# Patient Record
Sex: FEMALE | Race: WHITE | Employment: STUDENT | URBAN - NONMETROPOLITAN AREA
[De-identification: names, ages, dates, MRNs, and addresses within clinical notes are randomized per-mention and may not be internally consistent; named-entity substitution may affect disease eponyms.]

---

## 2017-01-24 ENCOUNTER — HOSPITAL ENCOUNTER (OUTPATIENT)
Dept: PHYSICAL THERAPY | Age: 22
Discharge: HOME | End: 2017-01-24
Admitting: ORTHOPAEDIC SURGERY

## 2017-01-25 ENCOUNTER — OFFICE VISIT (OUTPATIENT)
Dept: ORTHOPEDIC SURGERY | Age: 22
End: 2017-01-25

## 2017-01-25 VITALS — HEIGHT: 67 IN | BODY MASS INDEX: 29.82 KG/M2 | WEIGHT: 190 LBS

## 2017-01-25 DIAGNOSIS — M54.6 ACUTE MIDLINE THORACIC BACK PAIN: Primary | ICD-10-CM

## 2017-01-25 PROCEDURE — 99213 OFFICE O/P EST LOW 20 MIN: CPT | Performed by: ORTHOPAEDIC SURGERY

## 2017-01-31 ENCOUNTER — HOSPITAL ENCOUNTER (OUTPATIENT)
Dept: PHYSICAL THERAPY | Age: 22
Discharge: HOME | End: 2017-01-31
Admitting: ORTHOPAEDIC SURGERY

## 2017-02-16 ENCOUNTER — HOSPITAL ENCOUNTER (OUTPATIENT)
Dept: PHYSICAL THERAPY | Age: 22
Discharge: HOME | End: 2017-02-16
Admitting: ORTHOPAEDIC SURGERY

## 2017-02-23 ENCOUNTER — HOSPITAL ENCOUNTER (OUTPATIENT)
Dept: PHYSICAL THERAPY | Age: 22
Discharge: HOME | End: 2017-02-23
Admitting: ORTHOPAEDIC SURGERY

## 2017-03-01 ENCOUNTER — HOSPITAL ENCOUNTER (OUTPATIENT)
Dept: PHYSICAL THERAPY | Age: 22
Discharge: HOME | End: 2017-03-01
Admitting: ORTHOPAEDIC SURGERY

## 2017-03-07 ENCOUNTER — HOSPITAL ENCOUNTER (OUTPATIENT)
Dept: PHYSICAL THERAPY | Age: 22
Discharge: HOME | End: 2017-03-07
Admitting: ORTHOPAEDIC SURGERY

## 2017-03-09 ENCOUNTER — HOSPITAL ENCOUNTER (OUTPATIENT)
Dept: PHYSICAL THERAPY | Age: 22
Discharge: HOME | End: 2017-03-09
Admitting: ORTHOPAEDIC SURGERY

## 2017-03-14 ENCOUNTER — HOSPITAL ENCOUNTER (OUTPATIENT)
Dept: PHYSICAL THERAPY | Age: 22
Discharge: HOME | End: 2017-03-14
Admitting: ORTHOPAEDIC SURGERY

## 2017-03-16 ENCOUNTER — HOSPITAL ENCOUNTER (OUTPATIENT)
Dept: PHYSICAL THERAPY | Age: 22
Discharge: HOME | End: 2017-03-16
Admitting: ORTHOPAEDIC SURGERY

## 2017-03-21 ENCOUNTER — HOSPITAL ENCOUNTER (OUTPATIENT)
Dept: PHYSICAL THERAPY | Age: 22
Discharge: HOME | End: 2017-03-21
Admitting: ORTHOPAEDIC SURGERY

## 2017-03-23 ENCOUNTER — HOSPITAL ENCOUNTER (OUTPATIENT)
Dept: PHYSICAL THERAPY | Age: 22
Discharge: HOME | End: 2017-03-23
Admitting: ORTHOPAEDIC SURGERY

## 2017-03-28 ENCOUNTER — HOSPITAL ENCOUNTER (OUTPATIENT)
Dept: PHYSICAL THERAPY | Age: 22
Discharge: HOME | End: 2017-03-28
Admitting: ORTHOPAEDIC SURGERY

## 2017-03-30 ENCOUNTER — HOSPITAL ENCOUNTER (OUTPATIENT)
Dept: PHYSICAL THERAPY | Age: 22
Discharge: HOME | End: 2017-03-30
Admitting: ORTHOPAEDIC SURGERY

## 2017-04-04 ENCOUNTER — HOSPITAL ENCOUNTER (OUTPATIENT)
Dept: PHYSICAL THERAPY | Age: 22
Discharge: HOME | End: 2017-04-04
Admitting: ORTHOPAEDIC SURGERY

## 2017-04-11 ENCOUNTER — HOSPITAL ENCOUNTER (OUTPATIENT)
Dept: PHYSICAL THERAPY | Age: 22
Discharge: HOME | End: 2017-04-11
Admitting: ORTHOPAEDIC SURGERY

## 2017-04-27 ENCOUNTER — HOSPITAL ENCOUNTER (OUTPATIENT)
Dept: PHYSICAL THERAPY | Age: 22
Discharge: HOME | End: 2017-04-27
Admitting: ORTHOPAEDIC SURGERY

## 2017-05-02 ENCOUNTER — HOSPITAL ENCOUNTER (OUTPATIENT)
Dept: PHYSICAL THERAPY | Age: 22
Discharge: HOME | End: 2017-05-02
Admitting: ORTHOPAEDIC SURGERY

## 2017-05-04 ENCOUNTER — HOSPITAL ENCOUNTER (OUTPATIENT)
Dept: PHYSICAL THERAPY | Age: 22
Discharge: HOME | End: 2017-05-04
Admitting: ORTHOPAEDIC SURGERY

## 2017-05-09 ENCOUNTER — HOSPITAL ENCOUNTER (OUTPATIENT)
Dept: PHYSICAL THERAPY | Age: 22
Discharge: HOME | End: 2017-05-09
Admitting: ORTHOPAEDIC SURGERY

## 2018-01-31 ENCOUNTER — OFFICE VISIT (OUTPATIENT)
Dept: ORTHOPEDIC SURGERY | Age: 23
End: 2018-01-31

## 2018-01-31 VITALS — HEIGHT: 67 IN | BODY MASS INDEX: 29.82 KG/M2 | WEIGHT: 190 LBS

## 2018-01-31 DIAGNOSIS — S29.019D THORACIC MYOFASCIAL STRAIN, SUBSEQUENT ENCOUNTER: Primary | ICD-10-CM

## 2018-01-31 PROCEDURE — 99214 OFFICE O/P EST MOD 30 MIN: CPT | Performed by: ORTHOPAEDIC SURGERY

## 2018-01-31 NOTE — PROGRESS NOTES
History of Present Illness: Recheck evaluation still having left mid scapular pain  Brian Marrufo is a 25 y.o. female    Location left Shoulder  Severity moderate  Duration it's really not gone completely away since I last saw her  Associated sign/symptoms pain with sleeping pain with training pain with almost all activities    Medical History  Patient's medications, allergies, past medical, surgical, social and family histories were reviewed and updated as appropriate. Review of Systems  Pertinent items are noted in HPI  Review of systems reviewed from Patient History Form dated on 1/31/18 and available in the patient's chart under the Media tab. No change in her medical history form                                         Examination    General Exam:   Vitals: Height 5' 7\" (1.702 m), weight 190 lb (86.2 kg). Constitutional: Patient is adequately groomed with no evidence of malnutrition  Mental Status: The patient is oriented to time, place and person. The patient's mood and affect are appropriate. PHYSICAL EXAM:      Shoulder Examination  Inspection:  No swelling, no deformity, no erythema, no drainage, no signs of infection     Palpation:  Palpation reveals no effusion minimal pain, no warmth,     Range of Motion:  genital range of motion with no crepitus passive motion to 150° of forward flextion    Strength:  Intact strength with internal and external rotation along with  supraspinatus isolation bilaterally, Shoulder shrug is 5 over 5 , cervical spine strength is excellent, flexion extension at the elbow is 5 over 5 wrist and hand strength is equal bilaterally no winging no muscle atrophy.    Palpation:  Lateral deltoid no pain to palpation  AC joint no pain to palopation  No pain Anterior to palpation  Moderate pain Posterior to palpation  Mild trapezial pain to palpation  Range of Motion:  Abduction --150 degrees  Flexion-- 180 degrees  Extension-- between 45-60 degrees  Latera/external  rotation --close to 90 degrees  Medial/ internal rotation -- between 70-90 degrees    Strength:  Left shoulder strength:   internal rotation against resistance is 5/5  external rotation against resistance is 5/5  and supraspinatus isolation against resistance is 5/5, Shoulder shrug is 5 over 5 , cervical spine strength is excellent, flexion extension at the elbow is 5 over 5 wrist and hand strength is equal bilaterally with supination pronation and flexion and extension  no winging no muscle atrophy. Special Tests: Palpation demonstrates no swelling no effusion no pain. There is full active and passive range of motion bilaterally. Strength is excellent with internal rotation against resistance external rotation against resistance supraspinatus isolation against resistance. Shoulder shrug strength is 5 over 5 equal bilaterally. Radial ulnar and median nerve function is intact. Capillary refill is brisk. Elbow motion finger and wrist motion is full equal bilaterally. Deep tendon reflexes of the Brachial radialis, biceps, tricepsAre all +2/4 equal bilaterally. Cervical spine range of motion is full without pain negative Spurling's test.  Load-and-shift test is negative. Crank test is negative. Apprehension and relocation is negative. Anterior and posterior glide are equal bilaterally. Negative sulcus sign. No signs of any significant multidirectional instability. There is no scapular winging. There is no muscle atrophy of the latissimus dorsi, the deltoid, the periscapular musculature,The trapezius musculature or the pectoralis musculature. Negative Neer's test, negative Fong test, no pain with crossarm elevation. Contralateral Shoulder exam: Palpation demonstrates no swelling no effusion no pain. There is full active and passive range of motion bilaterally.   Strength is excellent with internal rotation against resistance external rotation against resistance

## 2018-02-01 ENCOUNTER — HOSPITAL ENCOUNTER (OUTPATIENT)
Dept: OTHER | Age: 23
Discharge: HOME | End: 2018-02-28
Attending: ORTHOPAEDIC SURGERY | Admitting: ORTHOPAEDIC SURGERY

## 2018-02-05 ENCOUNTER — HOSPITAL ENCOUNTER (OUTPATIENT)
Dept: PHYSICAL THERAPY | Age: 23
Discharge: HOME | End: 2018-01-31
Admitting: ORTHOPAEDIC SURGERY

## 2018-02-07 ENCOUNTER — HOSPITAL ENCOUNTER (OUTPATIENT)
Dept: PHYSICAL THERAPY | Age: 23
Discharge: HOME | End: 2018-02-08
Admitting: ORTHOPAEDIC SURGERY

## 2018-02-07 NOTE — FLOWSHEET NOTE
[address] [place] [address]  Phone: [phone] Fax: [phone]      Physical Therapy Daily Treatment Note  Date:  2018    Patient Name:  Rojelio Schofield    :  1995  MRN: [id number]  Restrictions/Precautions:    Medical/Treatment Diagnosis Information:  Diagnosis: thoracic pain  Treatment Diagnosis: thoracic pain M54.6; shoulder and periscapular weakness N55.7  Insurance/Certification information:  PT Insurance Information: [employer]  Physician Information:  Referring Practitioner: Dr. Zheng Reed of care signed (Y/N):     Date of Patient follow up with Physician:     G-Code (if applicable):      Date G-Code Applied:  2018  PT G-Codes  Functional Assessment Tool Used: BRADY  Score: 22%  Functional Limitation: Changing and maintaining body position  Changing and Maintaining Body Position Current Status (): At least 20 percent but less than 40 percent impaired, limited or restricted  Changing and Maintaining Body Position Goal Status (): At least 1 percent but less than 20 percent impaired, limited or restricted    Progress Note: [x]  Yes  [x]  No  Next due by: Visit #10      Latex Allergy:  [x]NO      []YES  Preferred Language for Healthcare:   [x]English       []other:    Visit # Insurance Allowable   [facility]     Pain level:  4/10     SUBJECTIVE:  Patient reports that she was really sore after last session in upper back. States that lower L side of thoracic spine is really sore today and bothering her when she breathes. 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 States exercises at home are doing good; tiring but good. OBJECTIVE: mild deficits in L shoulder AROM compared to R shoulder. Observation:   Test measurements:      RESTRICTIONS/PRECAUTIONS: has had episodes of blacking out at dance with cervical extension.  Patient with unequal pupil dilation during session. Exercises/Interventions:   Therapeutic Ex: 30 min Sets/sec Reps Notes   UBE 1 5    Cane AAROM flex/press      3 way Isomet      T- band Row/pinch      T- band lower pinch      T- band ER activation      Standing SA punch with band      Prone extension and rows 2 10 3#   Prone HAB (mid trap)/ rhomboids 1 10  focus on eccentric control   Seat Table slides/Wall Slides      Seated HH  Depression      No Money      Standing flex/scap      Prone HAB lifts 2 10 0#   Prone pull downs 2 10 Red band   Cat camel SA punch with blue band Prone Rhomboid lift Supine scaption 1 10    Manual Intervention: 20 min      Shld /GH Mobs      Post Cap mobs      Thoracic/Rib manipualtion 1 20 Mobilization T4-10 PA , rib mobilization rib 1-8   CT MT/Mobs      PROM MT      Elbow mobs            NMR re-education      T-spine Ext      GH depress/compress      Scap/GH NMR      Body blade      Wall ball roll      Wall Ball bounce      Ball drops      Leny Scap Bio          Therapeutic Exercise and NMR EXR  [x] (00742) Provided verbal/tactile cueing for activities related to strengthening, flexibility, endurance, ROM  for improvements in scapular, scapulothoracic and UE control with self care, reaching, carrying, lifting, house/yardwork, driving/computer work.    [] (37668) Provided verbal/tactile cueing for activities related to improving balance, coordination, kinesthetic sense, posture, motor skill, proprioception  to assist with  scapular, scapulothoracic and UE control with self care, reaching, carrying, lifting, house/yardwork, driving/computer work. Therapeutic Activities:    [x] (64698 or 78439) Provided verbal/tactile cueing for activities related to improving balance, coordination, kinesthetic sense, posture, motor skill, proprioception and motor activation to allow for proper function of scapular, scapulothoracic and UE control with self care, carrying, lifting, driving/computer work.      Home Exercise Program:    [x] index score of 19% or less for the DASH to assist with reaching prior level of function. 2. Patient will demonstrate increased AROM to +/- 5 degrees of contralateral to allow for proper joint functioning as indicated by patients Functional Deficits. 3. Patient will demonstrate an increase in Strength to good scapular and core control, w/in 5lbs HHD for UE to allow for proper functional mobility as indicated by patients Functional Deficits. 4. Patient will return to dancing/working out functional activities without increased symptoms or restriction. 5. Patient will report  pain with sleeping. (patient specific functional goal)    Progression Towards Functional goals:  [x] Patient is progressing as expected towards functional goals listed. [] Progression is slowed due to complexities listed. [] Progression has been slowed due to co-morbidities. [] Plan just implemented, too soon to assess goals progression  [] Other:     ASSESSMENT:  Patient with decreased rib mobility along sternum. Patient with ok mobility on R side. Patient tolerated mild mobilization of ribs on L side. Post PA rib mobilization patient with reports of improved upper thoracic pain. Discussed with patient we will not manipulate thoracic further at this time in order to improve overall mobility of ribs and strengthen core and periscapular muscles. Patient did have mild continued with lower rib pain T10-11; that was more irritated with mid trap and rhomboid activation. Patient reporting 2/10 in upper thoracic and 3/10 in lower thoracic at conclusion. Patient extremely fatigue in periscapular musculature at conclusion.      Return to Play: (if applicable)   []  Stage 1: Intro to Strength   []  Stage 2: Dynamic Strength and Intro to Plyometrics   []  Stage 3: Advanced Plyometrics and Intro to Throwing   []  Stage 4: Sport specific Training/Return to Sport     []  Ready to Return to Play, [facility]   []  Not Ready for

## 2018-02-13 ENCOUNTER — HOSPITAL ENCOUNTER (OUTPATIENT)
Dept: PHYSICAL THERAPY | Age: 23
Discharge: HOME | End: 2018-02-14
Admitting: ORTHOPAEDIC SURGERY

## 2018-02-13 NOTE — FLOWSHEET NOTE
weeks  1. Disability index score of 19% or less for the DASH to assist with reaching prior level of function. 2. Patient will demonstrate increased AROM to +/- 5 degrees of contralateral to allow for proper joint functioning as indicated by patients Functional Deficits. 3. Patient will demonstrate an increase in Strength to good scapular and core control, w/in 5lbs HHD for UE to allow for proper functional mobility as indicated by patients Functional Deficits. 4. Patient will return to dancing/working out functional activities without increased symptoms or restriction. 5. Patient will report  pain with sleeping. (patient specific functional goal)    Progression Towards Functional goals:  [x] Patient is progressing as expected towards functional goals listed. [] Progression is slowed due to complexities listed. [] Progression has been slowed due to co-morbidities. [] Plan just implemented, too soon to assess goals progression  [] Other:     ASSESSMENT:  Patient with less discomfort to ribs with mobilization and overall improved mobility; quicker improvement in thoracic spine pain with rib AP . Patient with continued scapular dysfunction with OH motion. Required second round of rib AP at conclusion of session to relieve further discomfort. Discussed need to continue with periscapular strengthening. Patient reporting 1-2/10 in upper thoracic at conclusion. Patient extremely fatigue in periscapular musculature at conclusion.      Return to Play: (if applicable)   []  Stage 1: Intro to Strength   []  Stage 2: Dynamic Strength and Intro to Plyometrics   []  Stage 3: Advanced Plyometrics and Intro to Throwing   []  Stage 4: Sport specific Training/Return to Sport     []  Ready to Return to Play, [employer]   []  Not Ready for Return to Sports   Comments:      Treatment/Activity Tolerance:  [x] Patient tolerated treatment well [] Patient limited by fatique  [] Patient limited by pain  []

## 2018-02-15 ENCOUNTER — HOSPITAL ENCOUNTER (OUTPATIENT)
Dept: PHYSICAL THERAPY | Age: 23
Discharge: HOME | End: 2018-02-16
Admitting: ORTHOPAEDIC SURGERY

## 2018-02-20 ENCOUNTER — HOSPITAL ENCOUNTER (OUTPATIENT)
Dept: PHYSICAL THERAPY | Age: 23
Discharge: HOME | End: 2018-02-21
Admitting: ORTHOPAEDIC SURGERY

## 2018-02-20 NOTE — FLOWSHEET NOTE
[address] [place] [address]  Phone: [phone] Fax: [phone]      Physical Therapy Daily Treatment Note  Date:  2018    Patient Name:  Nara Duran    :  1995  MRN: [id number]  Restrictions/Precautions:    Medical/Treatment Diagnosis Information:  Diagnosis: thoracic pain  Treatment Diagnosis: thoracic pain M54.6; shoulder and periscapular weakness I99.6  Insurance/Certification information:  PT Insurance Information: [employer]  Physician Information:  Referring Practitioner: Dr. José Manuel Neal of care signed (Y/N):     Date of Patient follow up with Physician:     G-Code (if applicable):      Date G-Code Applied:  2018  PT G-Codes  Functional Assessment Tool Used: BRADY  Score: 22%  Functional Limitation: Changing and maintaining body position  Changing and Maintaining Body Position Current Status (): At least 20 percent but less than 40 percent impaired, limited or restricted  Changing and Maintaining Body Position Goal Status (): At least 1 percent but less than 20 percent impaired, limited or restricted    Progress Note: []  Yes  [x]  No  Next due by: Visit #10      Latex Allergy:  [x]NO      []YES  Preferred Language for Healthcare:   [x]English       []other:    Visit # [facility] Allowable   [facility]     Pain level:  3/10 upper thoracic, 5/10 lower thoracic     SUBJECTIVE:  Patient states that she has been utilizing self mobilization technique for ribs and has been helping a lot. Continues with soreness after dance; despite not lifting. OBJECTIVE: mild deficits in L shoulder AROM compared to R shoulder. Observation:   Test measurements:      RESTRICTIONS/PRECAUTIONS: has had episodes of blacking out at dance with cervical extension. Patient with unequal pupil dilation during session.      Exercises/Interventions:   Therapeutic Ex: 30 min Sets/sec Reps Notes   UBE 1 5       3 way Isomet      T- band Row/pinch      T- band lower pinch      Thoracic lift over swiss ball- supine 2 10 With perturbations   Seated swiss ball- core/scap stab 2 10 30#; bilat   Prone extension and rows 15 sec, 1 10 Swiss ball, 2#   Prone HAB (mid trap)/ rhomboids; scaption 5 sec, 1 10  swiss ball, 2# focus on eccentric control   Seat Table slides/Wall Slides      Seated HH  Depression      Scap retraction on wall 1 10 orange   Prone snow angels 1 10    Prone pull downs 1 10 Red band   Cat camel SA punch with blue band Prone Rhomboid lift    Manual Intervention: 12 min      Shld /GH Mobs      Post Cap mobs      Thoracic/Rib manipualtion 1 12 Mobilization L T4-10 AP    Elbow mobs            NMR re-education      T-spine Ext      GH depress/compress      Scap/GH NMR      Body blade      Wall ball roll      Wall Ball bounce      Ball drops      Leny Scap Bio          Therapeutic Exercise and NMR EXR  [x] (79936) Provided verbal/tactile cueing for activities related to strengthening, flexibility, endurance, ROM  for improvements in scapular, scapulothoracic and UE control with self care, reaching, carrying, lifting, house/yardwork, driving/computer work.    [] (85318) Provided verbal/tactile cueing for activities related to improving balance, coordination, kinesthetic sense, posture, motor skill, proprioception  to assist with  scapular, scapulothoracic and UE control with self care, reaching, carrying, lifting, house/yardwork, driving/computer work. Therapeutic Activities:    [x] (61231 or 50726) Provided verbal/tactile cueing for activities related to improving balance, coordination, kinesthetic sense, posture, motor skill, proprioception and motor activation to allow for proper function of scapular, scapulothoracic and UE control with self care, carrying, lifting, driving/computer work.      Home Exercise Program:    [x] (65173) Reviewed/Progressed HEP activities related to strengthening, flexibility, endurance, Patient will demonstrate increased AROM to +/- 5 degrees of contralateral to allow for proper joint functioning as indicated by patients Functional Deficits. 3. Patient will demonstrate an increase in Strength to good scapular and core control, w/in 5lbs HHD for UE to allow for proper functional mobility as indicated by patients Functional Deficits. 4. Patient will return to dancing/working out functional activities without increased symptoms or restriction. 5. Patient will report  pain with sleeping. (patient specific functional goal)    Progression Towards Functional goals:  [x] Patient is progressing as expected towards functional goals listed. [] Progression is slowed due to complexities listed. [] Progression has been slowed due to co-morbidities. [] Plan just implemented, too soon to assess goals progression  [] Other:     ASSESSMENT:  Patient with continued minimized discomfort to ribs with mobilization and overall improved mobility; quicker improvement in thoracic spine pain with rib AP . Stating no pain after rib mobs and no longer having discomfort with T 9-10 region after mobs. Patient continues to improved tolerance with all periscapular strengthening and reports feeling like she is getting stronger. With rotation to R patient with mild discomfort in L lower rib. Able to correct again with rib mobilization. Appropriately fatigued at conclusion.        Return to Play: (if applicable)   []  Stage 1: Intro to Strength   []  Stage 2: Dynamic Strength and Intro to Plyometrics   []  Stage 3: Advanced Plyometrics and Intro to Throwing   []  Stage 4: Sport specific Training/Return to Sport     []  Ready to Return to Play, [employer]   []  Not Ready for Return to Sports   Comments:      Treatment/Activity Tolerance:  [x] Patient tolerated treatment well [] Patient limited by fatique  [] Patient limited by pain  [] Patient limited by other medical complications  [] Other: Prognosis: [x] Good [] Fair  [] Poor    Patient Requires Follow-up: [x] Yes  [] No    PLAN:  Patient to benefit from skilled PT services 2x week for 4 weeks for strengthening, ROM, neuromuscular control, manual therapies to include DN and mobilization/manipulation as needed  [x] Continue per plan of care [] Alter current plan (see comments)  [] Plan of care initiated [] Hold pending MD visit [] Discharge    Electronically signed by: Ijeoma Rutledge [id number]

## 2018-02-21 ENCOUNTER — OFFICE VISIT (OUTPATIENT)
Dept: ORTHOPEDIC SURGERY | Age: 23
End: 2018-02-21

## 2018-02-21 DIAGNOSIS — M51.24 THORACIC DISC HERNIATION: ICD-10-CM

## 2018-02-21 DIAGNOSIS — S29.019D THORACIC MYOFASCIAL STRAIN, SUBSEQUENT ENCOUNTER: Primary | ICD-10-CM

## 2018-02-21 PROCEDURE — 99214 OFFICE O/P EST MOD 30 MIN: CPT | Performed by: ORTHOPAEDIC SURGERY

## 2018-02-21 NOTE — PROGRESS NOTES
for this visit. Constitutional: Patient is adequately groomed with no evidence of malnutrition  Mental Status: The patient is oriented to time, place and person. The patient's mood and affect are appropriate. Lymphatic: The lymphatic examination bilaterally reveals all areas to be without enlargement or induration. Vascular: Examination reveals no swelling or calf tenderness. Peripheral pulses are palpable and 2+. Neurological: The patient has good coordination. There is no weakness or sensory deficit. Skin:    Head/Neck: inspection reveals no rashes, ulcerations or lesions. Trunk:  inspection reveals no rashes, ulcerations or lesions. Right Lower Extremity: inspection reveals no rashes, ulcerations or lesions. Left Lower Extremity: inspection reveals no rashes, ulcerations or lesions. Thoracic Spine Examination  Inspection:  In looking at there is no obvious deformity    Palpation:  Palpation reveals left-sided midthoracic pain and muscle spasm    Rang of Motion:  She has good range of motion with flexion and extension side bending and rotation it's definitely much more improved from her previous visits    Strength:  Excellent strength internal/external rotation and supraspinatus isolation bilaterally,Shoulder shrug is 5 over 5 , cervical spine strength is excellent, flexion extension at the elbow is 5 over 5 wrist and hand strength is equal bilaterally no winging no muscle atrophy      Special Tests:  Radioulnar and median nerve function is intact capillary refill is brisk sensation is intact negative Tinel's and negative Phalen's at the wrist negative Tinel's at the elbow she still has pain to palpation in the mid thoracic on the left side with some muscle spasm    Skin: There are no rashes, ulcerations or lesions. Gait: Normal    Additional Comments:     Additional Examinations:  Neck: Examination of the neck does not show any tenderness, deformity or injury.   Range of motion is unremarkable. There is no gross instability. There are no rashes, ulcerations or lesions. Strength and tone are normal.  Lower Back: Examination of the lower back does not show any tenderness, deformity or injury. Range of motion is unremarkable. There is no gross instability. There are no rashes, ulcerations or lesions. Strength and tone are normal.      Diagnostic Testing:    Views MRI multiple views taken in the office today  Body Part thoracic spine Impression T8/T9 left sided disc herniation with nerve impingement          Assessment:  T8/T9 left-sided disc herniation with impingement    Impression: Persistent pain not alleviated by conservative therapy    Office Procedures:  No orders of the defined types were placed in this encounter. Treatment Plan:  I would like for her to follow up with Dr. Sonya Pena for evaluation and possible thoracic epidural injection      Miguel Bueno D.O.   [facility] Tra[facility]  Board Certified  [facility] [employer] Team Physician

## 2018-02-26 ENCOUNTER — OFFICE VISIT (OUTPATIENT)
Dept: ORTHOPEDIC SURGERY | Age: 23
End: 2018-02-26

## 2018-02-26 ENCOUNTER — HOSPITAL ENCOUNTER (OUTPATIENT)
Dept: PHYSICAL THERAPY | Age: 23
Discharge: HOME | End: 2018-02-27
Admitting: ORTHOPAEDIC SURGERY

## 2018-02-26 VITALS
WEIGHT: 210 LBS | HEIGHT: 68 IN | DIASTOLIC BLOOD PRESSURE: 78 MMHG | SYSTOLIC BLOOD PRESSURE: 125 MMHG | BODY MASS INDEX: 31.83 KG/M2

## 2018-02-26 DIAGNOSIS — M51.24 THORACIC DISC HERNIATION: ICD-10-CM

## 2018-02-26 DIAGNOSIS — M47.814 SPONDYLOSIS OF THORACIC REGION WITHOUT MYELOPATHY OR RADICULOPATHY: Primary | ICD-10-CM

## 2018-02-26 PROCEDURE — 99243 OFF/OP CNSLTJ NEW/EST LOW 30: CPT | Performed by: PHYSICAL MEDICINE & REHABILITATION

## 2018-02-26 NOTE — PROGRESS NOTES
luisa Armas. Amadou John MD, JOHN, [facility]  Board Certified in [address] [place] [address]  Certified and Fellowship Trained in [facility])     This dictation was performed with a verbal recognition program [facility]) and it was checked for errors. It is possible that there are still dictated errors within this office note. If so, please bring any errors to my attention for an addendum. All efforts were made to ensure that this office note is accurate.

## 2018-02-26 NOTE — FLOWSHEET NOTE
[address] [place] [address]  Phone: [phone] Fax: [phone]      Physical Therapy Daily Treatment Note  Date:  2018    Patient Name:  Mike Daniels    :  1995  MRN: [id number]  Restrictions/Precautions:    Medical/Treatment Diagnosis Information:  Diagnosis: thoracic pain  Treatment Diagnosis: thoracic pain M54.6; shoulder and periscapular weakness W39.3  Insurance/Certification information:  PT Insurance Information: [facility]  Physician Information:  Referring Practitioner: Dr. Sharlene Foerman of care signed (Y/N):     Date of Patient follow up with Physician:     G-Code (if applicable):      Date G-Code Applied:  2018  PT G-Codes  Functional Assessment Tool Used: BRADY  Score: 22%  Functional Limitation: Changing and maintaining body position  Changing and Maintaining Body Position Current Status (): At least 20 percent but less than 40 percent impaired, limited or restricted  Changing and Maintaining Body Position Goal Status (): At least 1 percent but less than 20 percent impaired, limited or restricted    Progress Note: []  Yes  [x]  No  Next due by: Visit #10      Latex Allergy:  [x]NO      []YES  Preferred Language for Healthcare:   [x]English       []other:    Visit # Insurance Allowable   6 [facility]     Pain level:  3/10 upper thoracic, 5/10 lower thoracic     SUBJECTIVE:  Patient reports that she has an appointment with Dr. Adrianne Andres after PT today to receive injection in thoracic spine. Sore and painful in the usual spot entering therapy. OBJECTIVE: mild deficits in L shoulder AROM compared to R shoulder. Observation:   Test measurements:      RESTRICTIONS/PRECAUTIONS: has had episodes of blacking out at dance with cervical extension. Patient with unequal pupil dilation during session.      Exercises/Interventions:   Therapeutic Ex: 30 min Sets/sec Reps Notes   UBE 1 5 endurance, ROM of scapular, scapulothoracic and UE control with self care, reaching, carrying, lifting, house/yardwork, driving/computer work  [] (04996) Reviewed/Progressed HEP activities related to improving balance, coordination, kinesthetic sense, posture, motor skill, proprioception of scapular, scapulothoracic and UE control with self care, reaching, carrying, lifting, house/yardwork, driving/computer work      Manual Treatments:  PROM / STM / Oscillations-Mobs:  G-I, II, III, IV (PA's, Inf., Post.)  [x] (07920) Provided manual therapy to mobilize soft tissue/joints of cervical/CT, scapular GHJ and UE for the purpose of modulating pain, promoting relaxation,  increasing ROM, reducing/eliminating soft tissue swelling/inflammation/restriction, improving soft tissue extensibility and allowing for proper ROM for normal function with self care, reaching, carrying, lifting, house/yardwork, driving/computer work    Modalities:  Ice at conclusion to thoracic spine    Charges:  Timed Code Treatment Minutes: 42   Total Treatment Minutes: 45     [] EVAL (LOW) 87589 (typically 20 minutes face-to-face)  [] EVAL (MOD) 35673 (typically 30 minutes face-to-face)  [] EVAL (HIGH) 16377 (typically 45 minutes face-to-face)  [] RE-EVAL     [x] TR(75017) x  2   [] IONTO  [] NMR (74581) x      [] VASO  [x] Manual (60981) x  1    [] Other:  [] TA x       [] Mech Traction (96900)  [] ES(attended) (98514)      [] ES (un) (41385):     GOALS:  Patient stated goal: reduce pain, get stronger    Therapist goals for Patient:   Short Term Goals: To be achieved in: 2 weeks  1. Independent in HEP and progression per patient tolerance, in order to prevent re-injury. 2. Patient will have a decrease in pain to facilitate improvement in movement, function, and ADLs as indicated by Functional Deficits. Long Term Goals: To be achieved in: 4 weeks  1.  Disability index score of 19% or less for the DASH to assist with reaching prior level of Other:     Prognosis: [x] Good [] Fair  [] Poor    Patient Requires Follow-up: [x] Yes  [] No    PLAN:  Patient to benefit from skilled PT services 2x week for 4 weeks for strengthening, ROM, neuromuscular control, manual therapies to include DN and mobilization/manipulation as needed  [x] Continue per plan of care [] Alter current plan (see comments)  [] Plan of care initiated [] Hold pending MD visit [] Discharge    Electronically signed by: Jl Frias PTA [id number]

## 2018-02-27 ENCOUNTER — TELEPHONE (OUTPATIENT)
Dept: ORTHOPEDIC SURGERY | Age: 23
End: 2018-02-27

## 2018-02-28 ENCOUNTER — HOSPITAL ENCOUNTER (OUTPATIENT)
Dept: PHYSICAL THERAPY | Age: 23
Discharge: HOME | End: 2018-03-01
Admitting: ORTHOPAEDIC SURGERY

## 2018-02-28 NOTE — FLOWSHEET NOTE
[address] [place] [address]  Phone: [phone] Fax: [phone]      Physical Therapy Daily Treatment Note  Date:  2018    Patient Name:  Hola Monique    :  1995  MRN: [id number]  Restrictions/Precautions:    Medical/Treatment Diagnosis Information:  Diagnosis: thoracic pain  Treatment Diagnosis: thoracic pain M54.6; shoulder and periscapular weakness Y05.0  Insurance/Certification information:  PT Insurance Information: [facility]  Physician Information:  Referring Practitioner: Dr. Brando Riojas of care signed (Y/N):     Date of Patient follow up with Physician:     G-Code (if applicable):      Date G-Code Applied:  2018  PT G-Codes  Functional Assessment Tool Used: BRADY  Score: 22%  Functional Limitation: Changing and maintaining body position  Changing and Maintaining Body Position Current Status (): At least 20 percent but less than 40 percent impaired, limited or restricted  Changing and Maintaining Body Position Goal Status (): At least 1 percent but less than 20 percent impaired, limited or restricted    Progress Note: []  Yes  [x]  No  Next due by: Visit #10      Latex Allergy:  [x]NO      []YES  Preferred Language for Healthcare:   [x]English       []other:    Visit # Insurance Allowable   7 [facility]     Pain level:  6/10     SUBJECTIVE:  Patient states that she has had a bad few days. Had a hard practice yesterday and will have another tonight. Wishes to just work on reducing pain today and not get too fatigued for practice later today. Went to MD and will be getting thoracic injection on Monday. Will return for therapy next Wednesday. OBJECTIVE: mild deficits in L shoulder AROM compared to R shoulder. Observation:   Test measurements:      RESTRICTIONS/PRECAUTIONS: has had episodes of blacking out at dance with cervical extension.  Patient with unequal pupil dilation during

## 2018-03-01 ENCOUNTER — HOSPITAL ENCOUNTER (OUTPATIENT)
Dept: OTHER | Age: 23
Discharge: HOME | End: 2018-03-31
Attending: ORTHOPAEDIC SURGERY | Admitting: ORTHOPAEDIC SURGERY

## 2018-03-05 ENCOUNTER — HOSPITAL ENCOUNTER (OUTPATIENT)
Dept: PAIN MANAGEMENT | Age: 23
Discharge: HOME | End: 2018-03-05
Attending: PHYSICAL MEDICINE & REHABILITATION | Admitting: PHYSICAL MEDICINE & REHABILITATION

## 2018-03-05 VITALS
WEIGHT: 220 LBS | RESPIRATION RATE: 16 BRPM | TEMPERATURE: 97.4 F | HEART RATE: 71 BPM | OXYGEN SATURATION: 100 % | BODY MASS INDEX: 33.34 KG/M2 | SYSTOLIC BLOOD PRESSURE: 133 MMHG | HEIGHT: 68 IN | DIASTOLIC BLOOD PRESSURE: 84 MMHG

## 2018-03-05 LAB — HCG(URINE) PREGNANCY TEST: NEGATIVE

## 2018-03-05 RX ORDER — M-VIT,TX,IRON,MINS/CALC/FOLIC 27MG-0.4MG
1 TABLET ORAL DAILY
COMMUNITY

## 2018-03-05 ASSESSMENT — PAIN - FUNCTIONAL ASSESSMENT
PAIN_FUNCTIONAL_ASSESSMENT: 0-10
PAIN_FUNCTIONAL_ASSESSMENT: 0-10

## 2018-03-05 ASSESSMENT — PAIN DESCRIPTION - DESCRIPTORS: DESCRIPTORS: ACHING;SHARP

## 2018-03-05 NOTE — PROGRESS NOTES
IV discontinued, catheter intact, and dressing applied. Procedural dressing dry and intact. Bilateral  extremities equal in strength. Discharge instructions reviewed with patient or responsible adult, signed and copy given. All home medications have been reviewed. All questions answered and patient or responsible adult verbalized understanding.

## 2018-03-05 NOTE — OP NOTE
Patient:  Chantelle Hemphill  YOB: 1995  Medical Record #:  [id number]   Place:  [address] [place] [address]  Date:  3/5/2018   Physician:  Macy Escobar MD    Procedure: Lumbar Medial Branch Blocks - Left T8, T9, T10 and T11 Under Fluoroscopy    Pre-Procedure Diagnosis:  Lumbar spondylosis without radiculopathy    Post-Procedure Diagnosis: Same    Sedation: Local with 1% Lidocaine 3 ml and no IV sedation    EBL: None    Complications: None    Procedure Summary:    The patient was seen in the office for complaints of left sided thoracic pain. Review of the imaging and physical exam of the patient confirmed the pre-procedure diagnosis. After a thorough discussion of risks, benefits and alternatives informed consent was obtained. The patient was brought to the procedure suite and placed in the prone position. The skin overlying the lumbar spine was prepped and draped in the usual sterile fashion. Using fluoroscopic guidance, the juncture of the superior medial portion of the TP at the left T9 level was identified, where the left T8 medial branch resides. Through anesthetized skin a 22 gauge 3.5 inch curved tip spinal needle was advanced to the juncture of the superior articular process and the transverse process. 0.3 ml of Isovue M 300 was instilled showing a nerve root outline pattern, without evidence of vascular spread. A total of 0.5 ml of 0.5 % Marcaine was instilled over the left T8 medial branch at the T9 level. The procedure was repeated for the left T10, T11 and T12 levels corresponding to the left T9, T10 and T11 medial branches. The needles were removed and a band-aid applied. The patient was transferred to the post-operative area in stable condition.      Index Pain: 7/10 on NPS

## 2018-03-07 ENCOUNTER — HOSPITAL ENCOUNTER (OUTPATIENT)
Dept: PHYSICAL THERAPY | Age: 23
Discharge: HOME | End: 2018-03-08
Admitting: ORTHOPAEDIC SURGERY

## 2018-03-07 NOTE — PLAN OF CARE
Physician:     G-Code (if applicable):      Date G-Code Applied:  2/5/2018  PT G-Codes  Functional Assessment Tool Used: BRADY  Score: 22%  Functional Limitation: Changing and maintaining body position  Changing and Maintaining Body Position Current Status (): At least 20 percent but less than 40 percent impaired, limited or restricted  Changing and Maintaining Body Position Goal Status (): At least 1 percent but less than 20 percent impaired, limited or restricted    Progress Note: [x]  Yes  []  No  Next due by: Visit #10      Latex Allergy:  [x]NO      []YES  Preferred Language for Healthcare:   [x]English       []other:    Visit # Insurance Allowable   8 [facility]     Pain level:  3/10     SUBJECTIVE:  Patient reports that she had first round of injection on Monday and feels really sore since then. Thought she was to have injection in 2 spots but ended up having in 5 from T7-11. Patient states that she will be f/u with Dr Alethea Giordano on Monday and will be getting another round soon. Patient reports that she will be having a performance tonight; wishes to not push exercises as much today. States that since injection the lower pain that she was having is some better. Not needing to correct herself for upper thoracic as much; only 1 time since last PT session. OBJECTIVE: mild deficits in L shoulder AROM compared to R shoulder. Observation:   Test measurements:      RESTRICTIONS/PRECAUTIONS: has had episodes of blacking out at dance with cervical extension.  Patient with unequal pupil dilation during session.             ROM Left Right   Shoulder Flex 165 c mild discomfort in t spine 180   Shoulder Abd 180 180   Shoulder ER 90 90   Shoulder IR 75 75   Strength  Left Right   Shoulder Flex 18.3 19.2   Shoulder Scap 19.10 18.0   Shoulder ER 23.3 22.4   Shoulder IR 26.6 26.0   Shoulder Extension 20.9 21.5   Rhomboids 14.5 19.2   Mid Trap 14.4 c mild discomfort shoulder 15.0   Low Trap 15.0 15.0 dancing/working out functional activities without increased symptoms or restriction. -25% MET  5. Patient will report  pain with sleeping. (patient specific functional goal)- 50% MET    Progression Towards Functional goals:  [x] Patient is progressing as expected towards functional goals listed. [] Progression is slowed due to complexities listed. [] Progression has been slowed due to co-morbidities. [] Plan just implemented, too soon to assess goals progression  [] Other:     ASSESSMENT:  Patient has been seen for 8 visits of physical therapy for reducing pain in thoracic spine, strengthening of periscapular musculature, as well as improving neuromuscular control with shoulder and periscapular musculature. Patient with recent injection in T7-11 and reports that her pain has improved some with therapy and feels like she continues to get stronger. 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 Patient needs further strengthening around shoulder and periscapular musculature to assist with meeting demand of lifting other individuals overhead during routine.         Return to Play: (if applicable)   []  Stage 1: Intro to Strength   []  Stage 2: Dynamic Strength and Intro to Plyometrics   []  Stage 3: Advanced Plyometrics and Intro to Throwing   []  Stage 4: Sport specific Training/Return to Sport     []  Ready to Return to Play, [employer]   []  Not Ready for Return to Sports   Comments:      Treatment/Activity Tolerance:  [x] Patient tolerated treatment well [] Patient limited by fatique  [] Patient limited by pain  [] Patient limited by other medical complications  [] Other:     Prognosis: [x] Good [] Fair  [] Poor    Patient Requires Follow-up: [x] Yes  [] No    PLAN:  Patient to benefit from skilled PT services 2x week for 4 weeks for strengthening, ROM,

## 2018-03-12 ENCOUNTER — OFFICE VISIT (OUTPATIENT)
Dept: ORTHOPEDIC SURGERY | Age: 23
End: 2018-03-12

## 2018-03-12 VITALS
HEART RATE: 59 BPM | HEIGHT: 68 IN | BODY MASS INDEX: 33.35 KG/M2 | SYSTOLIC BLOOD PRESSURE: 129 MMHG | DIASTOLIC BLOOD PRESSURE: 86 MMHG | WEIGHT: 220.02 LBS

## 2018-03-12 DIAGNOSIS — M51.24 THORACIC DISC HERNIATION: Primary | ICD-10-CM

## 2018-03-12 PROCEDURE — 99213 OFFICE O/P EST LOW 20 MIN: CPT | Performed by: PHYSICAL MEDICINE & REHABILITATION

## 2018-03-12 NOTE — PROGRESS NOTES
Follow up: SPINE      CHIEF COMPLAINT:    Chief Complaint   Patient presents with    Back Pain     F/u MBB Left T8, T9, T10 & T11 3/5. Pt states no relief from procedure. HISTORY OF PRESENT ILLNESS:        The patient is a 25 y.o. female whom reports she returns after undergoing thoracic medial branch blocks at the left T8-T9 T10-T11 levels. She reports no significant improvement. She continues to have pain along the left medial border of the scapula. She finds this difficult to perform dance due to her pain. She has had this pain for at least 3 years without any benefit in physical therapy. Pain Assessment  Location of Pain: Back (LSP)  Location Modifiers: Posterior  Severity of Pain: 4  Quality of Pain: Dull, Sharp  Duration of Pain: Persistent  Frequency of Pain: Intermittent  Aggravating Factors: Bending, Exercise, Other (Comment) (Prolonged sitting;  Increased activity)  Limiting Behavior: Yes  Relieving Factors: Rest  Result of Injury: No  Work-Related Injury: No  Are there other pain locations you wish to document?: No    Associated signs and symptoms:   Neurogenic bowel or bladder symptoms:  no   Perceived weakness:  no   Difficulty walking:  no              Past Medical History:   Past Medical History:   Diagnosis Date    Anxiety     Depression       Past Surgical History:     Past Surgical History:   Procedure Laterality Date    WISDOM TOOTH EXTRACTION       Current Medications:     Current Outpatient Prescriptions:     Multiple Vitamins-Minerals (THERAPEUTIC MULTIVITAMIN-MINERALS) tablet, Take 1 tablet by mouth daily, Disp: , Rfl:     methylPREDNISolone (MEDROL, KELY,) 4 MG tablet, By mouth., Disp: 1 kit, Rfl: 0    ondansetron (ZOFRAN) 4 MG tablet, Take 1 tablet by mouth every 8 hours as needed for Nausea or Vomiting, Disp: 20 tablet, Rfl: 1    GILDESS 1/20 1-20 MG-MCG per tablet, , Disp: , Rfl:     FLUoxetine (PROZAC) 10 MG capsule, , Disp: , Rfl:     ALPRAZolam (XANAX) 1 MG tablet, , Disp: , Rfl:     FLUoxetine (PROZAC) 40 MG capsule, , Disp: , Rfl:   Allergies:  Patient has no known allergies. Social History:    reports that she has never smoked. She has never used smokeless tobacco. She reports that she does not use drugs. Family History:   History reviewed. No pertinent family history. REVIEW OF SYSTEMS:   CONSTITUTIONAL: Denies unexplained weight loss, fevers, chills or fatigue  NEUROLOGICAL: Denies unsteady gait or progressive weakness  MUSCULOSKELETAL: Denies joint swelling or redness  GI: Denies nausea, vomiting, diarrhea   : Denies bowel or bladder issues       PHYSICAL EXAM:    Vitals: Blood pressure 129/86, pulse 59, height 5' 7.99\" (1.727 m), weight 220 lb 0.3 oz (99.8 kg), last menstrual period 02/19/2018, not currently breastfeeding. GENERAL EXAM:  · General Apparence: Patient is adequately groomed with no evidence of malnutrition. · Psychiatric: Orientation: The patient is oriented to time, place and person. The patient's mood and affect are appropriate   · Vascular: Examination reveals no swelling and palpation reveals no tenderness in upper or lower extremities. Good capillary refill. · The lymphatic examination of the neck, axillae and groin reveals all areas to be without enlargement or induration  · Sensation is intact without deficit in the upper and lower extremities to light touch and pinprick  · Coordination of the upper and lower extremities are normal.    CERVICAL AND THORACIC EXAMINATION:  · Inspection: Local inspection shows no step-off or bruising. Cervical alignment is normal. No instability is noted. · Palpation and Percussion: No evidence of tenderness at the midline, and trapezius. Paraspinal tenderness is not present septal along the left medial scapular border. There is no paraspinal spasm.   · Range of Motion:  limited by 25% in all planes due to pain   · Strength: 5/5 bilateral upper extremities  · Special Tests:   Spurling's and

## 2018-03-14 ENCOUNTER — HOSPITAL ENCOUNTER (OUTPATIENT)
Dept: PAIN MANAGEMENT | Age: 23
Discharge: HOME | End: 2018-03-14
Attending: PHYSICAL MEDICINE & REHABILITATION | Admitting: PHYSICAL MEDICINE & REHABILITATION

## 2018-03-14 VITALS
DIASTOLIC BLOOD PRESSURE: 88 MMHG | HEART RATE: 7 BPM | TEMPERATURE: 97 F | OXYGEN SATURATION: 100 % | SYSTOLIC BLOOD PRESSURE: 120 MMHG | RESPIRATION RATE: 16 BRPM

## 2018-03-14 LAB — HCG(URINE) PREGNANCY TEST: NEGATIVE

## 2018-03-14 ASSESSMENT — PAIN DESCRIPTION - DESCRIPTORS: DESCRIPTORS: ACHING

## 2018-03-14 ASSESSMENT — PAIN - FUNCTIONAL ASSESSMENT
PAIN_FUNCTIONAL_ASSESSMENT: 0-10
PAIN_FUNCTIONAL_ASSESSMENT: 0-10

## 2018-03-19 ENCOUNTER — HOSPITAL ENCOUNTER (OUTPATIENT)
Dept: PHYSICAL THERAPY | Age: 23
Discharge: HOME | End: 2018-03-20
Admitting: ORTHOPAEDIC SURGERY

## 2018-03-19 NOTE — FLOWSHEET NOTE
[address] [place] [address] [place] [address]  Phone: [phone] Fax: [phone]        Physical Therapy Daily Treatment Note  Date:  3/19/2018    Patient Name:  Hola Monique    :  1995  MRN: [id number]  Restrictions/Precautions:    Medical/Treatment Diagnosis Information:  Diagnosis: thoracic pain  Treatment Diagnosis: thoracic pain M54.6; shoulder and periscapular weakness L91.2  Insurance/Certification information:  PT Insurance Information: [facility]  Physician Information:  Referring Practitioner: Dr. Brando Riojas of care signed (Y/N):     Date of Patient follow up with Physician:     G-Code (if applicable):      Date G-Code Applied:  2018  PT G-Codes  Functional Assessment Tool Used: BRADY  Score: 22%  Functional Limitation: Changing and maintaining body position  Changing and Maintaining Body Position Current Status (): At least 20 percent but less than 40 percent impaired, limited or restricted  Changing and Maintaining Body Position Goal Status (): At least 1 percent but less than 20 percent impaired, limited or restricted    Progress Note: [x]  Yes  []  No  Next due by: Visit #10      Latex Allergy:  [x]NO      []YES  Preferred Language for Healthcare:   [x]English       []other:    [facility]     Pain level:  6/10     SUBJECTIVE:  Patient reports that she had steroid injection in T8 last Wednesday. States she had been feeling better in area of T7-11; however had practice yesterday and did headstand kip up and after coming up from kip up she lost balance forward and had increased pain in thoracic spine. States that she has been having trouble sleeping since then and wishes to try to manage pain at this time. Is working on 12 hour dance practices for [employer] dance competition. OBJECTIVE: mild deficits in L shoulder AROM compared to R shoulder.    Observation: comments)  [] Plan of care initiated [] Hold pending MD visit [] Discharge    Electronically signed by: Ester Russell [id number]

## 2018-03-22 ENCOUNTER — HOSPITAL ENCOUNTER (OUTPATIENT)
Dept: PHYSICAL THERAPY | Age: 23
Discharge: HOME | End: 2018-03-23
Admitting: ORTHOPAEDIC SURGERY

## 2018-03-26 ENCOUNTER — HOSPITAL ENCOUNTER (OUTPATIENT)
Dept: PHYSICAL THERAPY | Age: 23
Discharge: HOME | End: 2018-03-27
Admitting: ORTHOPAEDIC SURGERY

## 2018-03-26 NOTE — FLOWSHEET NOTE
will return to dancing/working out functional activities without increased symptoms or restriction. -25% MET  5. Patient will report  pain with sleeping. (patient specific functional goal)- 50% MET    Progression Towards Functional goals:  [x] Patient is progressing as expected towards functional goals listed. [] Progression is slowed due to complexities listed. [] Progression has been slowed due to co-morbidities. [] Plan just implemented, too soon to assess goals progression  [] Other:     ASSESSMENT:  Patient reporting improvement in pain at conclusion from onset of session. Did well with IASTM and joint mobilization. Required AP to ribs today with patient reporting feeling improvement in pain. Patient with overall increased soft tissue restriction throughout UT and thoracic spine. Requests to hold off on strengthening as she has to continue to intensive dance practices for remainder of day.        Return to Play: (if applicable)   []  Stage 1: Intro to Strength   []  Stage 2: Dynamic Strength and Intro to Plyometrics   []  Stage 3: Advanced Plyometrics and Intro to Throwing   []  Stage 4: Sport specific Training/Return to Sport     []  Ready to Return to Play, [employer]   []  Not Ready for Return to Sports   Comments:      Treatment/Activity Tolerance:  [x] Patient tolerated treatment well [] Patient limited by fatique  [] Patient limited by pain  [] Patient limited by other medical complications  [] Other:     Prognosis: [x] Good [] Fair  [] Poor    Patient Requires Follow-up: [x] Yes  [] No    PLAN:  Patient to benefit from skilled PT services 2x week for 4 weeks for strengthening, ROM, neuromuscular control, manual therapies to include DN and mobilization/manipulation as needed  [x] Continue per plan of care [] Alter current plan (see comments)  [] Plan of care initiated [] Hold pending MD visit [] Discharge    Electronically signed by: Bibiana [id number]

## 2018-03-28 ENCOUNTER — HOSPITAL ENCOUNTER (OUTPATIENT)
Dept: PHYSICAL THERAPY | Age: 23
Discharge: HOME | End: 2018-03-29
Admitting: ORTHOPAEDIC SURGERY

## 2018-03-28 NOTE — FLOWSHEET NOTE
functional mobility as indicated by patients Functional Deficits. -75% MET  4. Patient will return to dancing/working out functional activities without increased symptoms or restriction. -25% MET  5. Patient will report  pain with sleeping. (patient specific functional goal)- 50% MET    Progression Towards Functional goals:  [x] Patient is progressing as expected towards functional goals listed. [] Progression is slowed due to complexities listed. [] Progression has been slowed due to co-morbidities. [] Plan just implemented, too soon to assess goals progression  [] Other:     ASSESSMENT:  Patient reporting improvement in pain at conclusion from onset of session. Did well with IASTM and joint mobilization. Less reports of pain with AP mobs to ribs today; feeling more improved per patient report. Patient with increased restriction in unilateral PA on L at L7-11 today. Applied postural taping to assist with maintaining improved position at dance practice tonight. Requests to hold off on strengthening as she has to continue to intensive dance practices for remainder of day.        Return to Play: (if applicable)   []  Stage 1: Intro to Strength   []  Stage 2: Dynamic Strength and Intro to Plyometrics   []  Stage 3: Advanced Plyometrics and Intro to Throwing   []  Stage 4: Sport specific Training/Return to Sport     []  Ready to Return to Play, [employer]   []  Not Ready for Return to Sports   Comments:      Treatment/Activity Tolerance:  [x] Patient tolerated treatment well [] Patient limited by fatique  [] Patient limited by pain  [] Patient limited by other medical complications  [] Other:     Prognosis: [x] Good [] Fair  [] Poor    Patient Requires Follow-up: [x] Yes  [] No    PLAN:  Patient to benefit from skilled PT services 2x week for 4 weeks for strengthening, ROM, neuromuscular control, manual therapies to include DN and mobilization/manipulation as needed  [x] Continue per plan of care [] Alter current plan (see comments)  [] Plan of care initiated [] Hold pending MD visit [] Discharge    Electronically signed by: Luis Herron [id number]

## 2018-04-01 ENCOUNTER — HOSPITAL ENCOUNTER (OUTPATIENT)
Dept: OTHER | Age: 23
Discharge: HOME | End: 2018-04-30
Attending: ORTHOPAEDIC SURGERY | Admitting: ORTHOPAEDIC SURGERY

## 2018-04-02 ENCOUNTER — HOSPITAL ENCOUNTER (OUTPATIENT)
Dept: PHYSICAL THERAPY | Age: 23
Discharge: HOME | End: 2018-04-03
Admitting: ORTHOPAEDIC SURGERY

## 2018-04-11 ENCOUNTER — HOSPITAL ENCOUNTER (OUTPATIENT)
Dept: PHYSICAL THERAPY | Age: 23
Discharge: HOME | End: 2018-04-12
Admitting: ORTHOPAEDIC SURGERY

## 2018-04-18 ENCOUNTER — OFFICE VISIT (OUTPATIENT)
Dept: ORTHOPEDIC SURGERY | Age: 23
End: 2018-04-18

## 2018-04-18 VITALS
WEIGHT: 220 LBS | SYSTOLIC BLOOD PRESSURE: 128 MMHG | HEIGHT: 68 IN | BODY MASS INDEX: 33.34 KG/M2 | DIASTOLIC BLOOD PRESSURE: 76 MMHG

## 2018-04-18 DIAGNOSIS — S29.019D THORACIC MYOFASCIAL STRAIN, SUBSEQUENT ENCOUNTER: Primary | ICD-10-CM

## 2018-04-18 DIAGNOSIS — M51.24 THORACIC DISC HERNIATION: ICD-10-CM

## 2018-04-18 PROCEDURE — 99213 OFFICE O/P EST LOW 20 MIN: CPT | Performed by: PHYSICAL MEDICINE & REHABILITATION

## 2018-04-18 RX ORDER — METHYLPREDNISOLONE 4 MG/1
TABLET ORAL
Qty: 1 KIT | Refills: 0 | Status: SHIPPED | OUTPATIENT
Start: 2018-04-18

## 2018-04-18 RX ORDER — BACLOFEN 10 MG/1
10 TABLET ORAL NIGHTLY
Qty: 30 TABLET | Refills: 0 | Status: SHIPPED | OUTPATIENT
Start: 2018-04-18 | End: 2018-05-26 | Stop reason: SDUPTHER

## 2018-04-23 ENCOUNTER — HOSPITAL ENCOUNTER (OUTPATIENT)
Dept: PHYSICAL THERAPY | Age: 23
Discharge: HOME | End: 2018-04-24
Admitting: ORTHOPAEDIC SURGERY

## 2018-04-27 ENCOUNTER — HOSPITAL ENCOUNTER (OUTPATIENT)
Dept: PHYSICAL THERAPY | Age: 23
Discharge: HOME | End: 2018-04-28
Admitting: ORTHOPAEDIC SURGERY

## 2018-05-01 ENCOUNTER — HOSPITAL ENCOUNTER (OUTPATIENT)
Dept: OTHER | Age: 23
Discharge: HOME | End: 2018-05-31
Attending: ORTHOPAEDIC SURGERY | Admitting: ORTHOPAEDIC SURGERY

## 2018-05-04 ENCOUNTER — HOSPITAL ENCOUNTER (OUTPATIENT)
Dept: PHYSICAL THERAPY | Age: 23
Discharge: HOME | End: 2018-05-05
Admitting: ORTHOPAEDIC SURGERY

## 2018-05-11 ENCOUNTER — HOSPITAL ENCOUNTER (OUTPATIENT)
Dept: PHYSICAL THERAPY | Age: 23
Discharge: HOME | End: 2018-05-12
Admitting: ORTHOPAEDIC SURGERY

## 2018-05-18 ENCOUNTER — HOSPITAL ENCOUNTER (OUTPATIENT)
Dept: PHYSICAL THERAPY | Age: 23
Discharge: HOME | End: 2018-05-19
Admitting: ORTHOPAEDIC SURGERY

## 2018-05-26 DIAGNOSIS — M51.24 THORACIC DISC HERNIATION: ICD-10-CM

## 2018-05-26 DIAGNOSIS — S29.019D THORACIC MYOFASCIAL STRAIN, SUBSEQUENT ENCOUNTER: ICD-10-CM

## 2018-05-29 RX ORDER — BACLOFEN 10 MG/1
10 TABLET ORAL NIGHTLY
Qty: 30 TABLET | Refills: 0 | Status: SHIPPED | OUTPATIENT
Start: 2018-05-29 | End: 2018-06-28

## 2018-06-01 ENCOUNTER — HOSPITAL ENCOUNTER (OUTPATIENT)
Dept: OTHER | Age: 23
Discharge: HOME | End: 2018-06-30
Attending: ORTHOPAEDIC SURGERY | Admitting: ORTHOPAEDIC SURGERY

## 2018-07-19 RX ORDER — BACLOFEN 10 MG/1
TABLET ORAL
Qty: 30 TABLET | Refills: 0 | Status: SHIPPED | OUTPATIENT
Start: 2018-07-19 | End: 2018-09-05 | Stop reason: SDUPTHER

## 2018-09-06 RX ORDER — BACLOFEN 10 MG/1
TABLET ORAL
Qty: 30 TABLET | Refills: 0 | Status: SHIPPED | OUTPATIENT
Start: 2018-09-06 | End: 2018-10-09 | Stop reason: SDUPTHER

## 2018-10-09 RX ORDER — BACLOFEN 10 MG/1
TABLET ORAL
Qty: 30 TABLET | Refills: 0 | Status: SHIPPED | OUTPATIENT
Start: 2018-10-09 | End: 2018-11-11 | Stop reason: SDUPTHER

## 2018-11-12 RX ORDER — BACLOFEN 10 MG/1
TABLET ORAL
Qty: 30 TABLET | Refills: 0 | Status: SHIPPED | OUTPATIENT
Start: 2018-11-12 | End: 2019-01-23 | Stop reason: SDUPTHER

## 2019-01-23 DIAGNOSIS — M51.24 THORACIC DISC HERNIATION: ICD-10-CM

## 2019-01-23 DIAGNOSIS — S29.019D THORACIC MYOFASCIAL STRAIN, SUBSEQUENT ENCOUNTER: Primary | ICD-10-CM

## 2019-01-24 RX ORDER — BACLOFEN 10 MG/1
TABLET ORAL
Qty: 30 TABLET | Refills: 0 | Status: SHIPPED | OUTPATIENT
Start: 2019-01-24 | End: 2019-02-28 | Stop reason: SDUPTHER

## 2019-02-28 DIAGNOSIS — S29.019D THORACIC MYOFASCIAL STRAIN, SUBSEQUENT ENCOUNTER: ICD-10-CM

## 2019-02-28 DIAGNOSIS — M51.24 THORACIC DISC HERNIATION: ICD-10-CM

## 2019-02-28 RX ORDER — BACLOFEN 10 MG/1
TABLET ORAL
Qty: 30 TABLET | Refills: 0 | Status: SHIPPED | OUTPATIENT
Start: 2019-02-28 | End: 2019-04-05 | Stop reason: SDUPTHER

## 2019-04-05 DIAGNOSIS — S29.019D THORACIC MYOFASCIAL STRAIN, SUBSEQUENT ENCOUNTER: ICD-10-CM

## 2019-04-05 DIAGNOSIS — M51.24 THORACIC DISC HERNIATION: ICD-10-CM

## 2019-04-08 RX ORDER — BACLOFEN 10 MG/1
TABLET ORAL
Qty: 30 TABLET | Refills: 0 | Status: SHIPPED | OUTPATIENT
Start: 2019-04-08

## 2023-07-26 ENCOUNTER — LAB REQUISITION (OUTPATIENT)
Dept: LAB | Facility: CLINIC | Age: 28
End: 2023-07-26

## 2023-07-26 DIAGNOSIS — Z13.220 ENCOUNTER FOR SCREENING FOR LIPOID DISORDERS: ICD-10-CM

## 2023-07-26 DIAGNOSIS — R63.5 ABNORMAL WEIGHT GAIN: ICD-10-CM

## 2023-07-26 DIAGNOSIS — Z13.1 ENCOUNTER FOR SCREENING FOR DIABETES MELLITUS: ICD-10-CM

## 2023-07-26 LAB
CHOLEST SERPL-MCNC: 180 MG/DL
FASTING STATUS PATIENT QL REPORTED: NO
GLUCOSE SERPL-MCNC: 79 MG/DL (ref 70–99)
HDLC SERPL-MCNC: 46 MG/DL
HOLD SPECIMEN: NORMAL
LDLC SERPL CALC-MCNC: 101 MG/DL
NONHDLC SERPL-MCNC: 134 MG/DL
TRIGL SERPL-MCNC: 167 MG/DL
TSH SERPL DL<=0.005 MIU/L-ACNC: 0.83 UIU/ML (ref 0.3–4.2)

## 2023-07-26 PROCEDURE — 84443 ASSAY THYROID STIM HORMONE: CPT | Performed by: ADVANCED PRACTICE MIDWIFE

## 2023-07-26 PROCEDURE — 80061 LIPID PANEL: CPT | Performed by: ADVANCED PRACTICE MIDWIFE

## 2023-07-26 PROCEDURE — 82947 ASSAY GLUCOSE BLOOD QUANT: CPT | Performed by: ADVANCED PRACTICE MIDWIFE
